# Patient Record
Sex: MALE | Race: WHITE
[De-identification: names, ages, dates, MRNs, and addresses within clinical notes are randomized per-mention and may not be internally consistent; named-entity substitution may affect disease eponyms.]

---

## 2018-04-23 ENCOUNTER — HOSPITAL ENCOUNTER (INPATIENT)
Dept: HOSPITAL 46 - ED | Age: 58
LOS: 4 days | Discharge: HOME | DRG: 200 | End: 2018-04-27
Attending: SURGERY | Admitting: SURGERY
Payer: COMMERCIAL

## 2018-04-23 VITALS — HEIGHT: 74 IN | BODY MASS INDEX: 21.17 KG/M2 | WEIGHT: 164.99 LBS
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
## 2018-04-23 DIAGNOSIS — Z79.84: ICD-10-CM

## 2018-04-23 DIAGNOSIS — E11.9: ICD-10-CM

## 2018-04-23 DIAGNOSIS — S27.0XXA: Primary | ICD-10-CM
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
## 2018-04-23 DIAGNOSIS — S22.42XA: ICD-10-CM
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
## 2018-04-23 PROCEDURE — 0W9B30Z DRAINAGE OF LEFT PLEURAL CAVITY WITH DRAINAGE DEVICE, PERCUTANEOUS APPROACH: ICD-10-PCS | Performed by: INTERNAL MEDICINE

## 2018-04-23 NOTE — NUR
NEW ED ADMIT. CHEST TUBE LEFT SIDE. RIB FX. 1L 02 VIA NC IN PLACE. SATURATING
100%. CHEST XRAY IN MORNING TO FOLLOW. BLOOD SUGAR 240 AT 1600. TELE 8 IN
PLACE. LR AT TKO. GLUCOPHAGE STARTED. DIABETIC. HASNT BEEN TAKING MEDS IN
YEARS. CONTINUOUS PULSE OX. MORPHIN 3MG X1.

## 2018-04-23 NOTE — NUR
pt arrived to med surg room 123 at 1130 with Veronique GRIFFIN RN and Thai DOWNS (nurse
supervisor). patient needed to urinate. standing next to bed attempting to
void into urinal now. seems unable to void. chest tube drainage reading 0ml.
some red blood in tubing from chest tube.

## 2018-04-23 NOTE — NUR
IN ROOM FOR REPORT, PT IS AWAKE IN BED WITH VISITOR IN THE ROOM. HE DENIES
NEEDS AT THIS TIME. CALL LIGHT IS WITHIN REACH.

## 2018-04-23 NOTE — NUR
IN ROOM TO ASSESS PT AND GIVE EVENING MEDICATIONS. PT REPORTS PAIN WITH
COUGHING AND MOVEMENT OF LEFT ARM. PERCOCET GIVEN. PT DENIES FURTHER NEEDS AT
THIS TIME.

## 2018-04-23 NOTE — NUR
IN TO TAKE PT VS. PT RESTING COMFORTABLY WITH HIS EYES CLOSED ON BED. CALL
LIGHT WITHIN REACH, NO REQUESTS AT THIS TIME.
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
## 2018-04-24 NOTE — NUR
PT SITTING IN CHAIR, MUCH MORE ALERT AND ORIENTED TODAY. PT STATED HE FEELS
MUCH BETTER. PT STATED THAT HE HOPES THE THORAX TUBE IS REMOVED TOMORROW. PT
DID SEEM TO ENJOY MY COMPANY. EXTENDED A BLESSING, WILL FOLLOW AS NEEDED

## 2018-04-24 NOTE — NUR
BEDSIDE REPORT RECEIVED FROM SUMAYA DOWNS. PATIENT SITTING UP IN BED. 7ML OF
DRAINAGE OVERNIGHT FROM CHEST TUBE. TOTAL OF 22ML OUT SO FAR.

## 2018-04-24 NOTE — NUR
SBA. SALINE LOCKED. TELE DCd. CHEST TUBE DRAINING SANGUINOUS FLUID. ACCUCHECKS
ACHS. SLIDING SCALE INSULIN. UP IN RECLINER MOST OF DAY. NO PAIN MEDS GIVEN.
PAIN 3/10 TODAY. MILAD TO REMOVE CHEST TUBE TOMORROW LIKELY.

## 2018-04-24 NOTE — NUR
IN ROOM TO ASSESS PT, CHEST TUBE IS STILL DRAINING SMALL AMOUNTS OF
SEROSANGUINOUS FLUID. PT CONTINUES TO HAVE DISCOMFORT AND PERCOCET WAS GIVEN.
DISCUSSED TAKING A STOOL SOFTENER WITH PT BUT HE DOES NOT WANT TO TAKE ONE
TONIGHT, HE WOULD RATHER START IN THE MORNING. PT EXPRESSED FRUSTRATION WITH
HAVING THE NEED TO GO EARLIER IN THE DAY BUT STAFF TOOK TOO LONG TO GET TO HIS
ROOM AND HE NO LONGER FELT THE NEED TO HAVE A BM. APPOLOGIZED TO THE PT AND
ASSURED HIM IF HE FELT THE NEED TO GO TONIGHT WE WOULD GET THERE ASAP. PT HAS
FRESH WATER AT BEDSIDE AND DENIES FURTHER NEEDS. CALL LIGHT IS WITHIN REACH.

## 2018-04-24 NOTE — NUR
PT SITTING UP IN RECLINER. 9ML DRAINED INTO CHEST TUBE CONTAINER SO FAR THIS
SHIFT. 3/10 PAIN IN LEFT RIBS. SMALL AMOUNT OF SEROSANQUINOUS DRAINAGE NOTED
IN TUBING FROM CHEST TUBE. STITCHES INTACT. DRESSING INTACT. 1L 02 VIA NC IN
PLACE.

## 2018-04-24 NOTE — NUR
IN ROOM FOR REPORT, PT IS AWAKE SITTING IN CHAIR. HE DENIES NEEDS AT THIS
TIME. CALL LIGHT IS WITHIN REACH.

## 2018-04-24 NOTE — NUR
PATIENT SITTING UP IN BED WATCHING TV. FRESH ICE WATER. CALL LIGHT WITHIN
REACH. NO OTHER NEEDS AT THIS TIME.

## 2018-04-24 NOTE — NUR
PATIENT SITTING UP IN CHAIR. PATIENT HAS VISITORS IN ROOM. CALL LIGHT WITHIN
REACH. NO OTHER NEEDS AT THIS TIME. CNA STATES SHE GOT PATIENT COFFEE.

## 2018-04-24 NOTE — NUR
TELEMETRY D/Cd PER MD. D/Cd IVF PER MD. TRANSFERRED PATIENT SBA TO RECLINER.
TOLERATED WELL. CONTINUOUS PULSE OX STILL IN PLACE. 1L 02 VIA NC IN PLACE AS
WELL. LIKELY REMOVE CHEST TUBE TOMORROW BY DR STINSON WHO WILL BE TAKING OVER
THIS AFTERNOON.

## 2018-04-25 NOTE — NUR
PT SBA TO BED FROM CHAIR. MEDICATED WITH 1 TAB PERCOCET FOR C/O PAIN AT CHEST
TUBE INSERTION SITE. CALL LIGHT WITHIN REACH. SCD'S ON. SATTING 96% ON 1LNC.

## 2018-04-25 NOTE — NUR
Medications reconciled. Patient takes no medications. He should by taking
metformin but cannot afford it and does not have a PCP. He was given RX value
list from [facility] and [facility] (~$10 for 90 day supply). Would like to see if
hospitalist could write RX for 90 day supply of metformin, should he be
discharged on this medication

## 2018-04-25 NOTE — NUR
PT SITTING UP IN BED AWAKE, ALERT AND ORIENTED. PT DENIES PAIN AT REST, PAIN
AT CHEST TUBE INSERTION SITE WITH DEEP BREATHS AND COUGHING. PT HAVING
MODERATE AMOUNT OF CLEAR/WHITE SPUTUM. BOTH IV SITES FLUSHING WELL, DRESSINGS
CDI. CHEST TUBE SITE WNL, DRESSING INTACT, SMALL AMOUNT OF OLD DRIED BLOOD
NOTED, SUTUTES VISIBLE, CHEST TUBE FUNCTIONING WELL, SCANT AMOUNT OF
SEROSANGINOUS DRAINAGE NOTED. CMS INTACT. RESP UNLABORED, RR 18, SATTING 99%
ON 1L NC. NO CREPITUS NOTED. ATES 100% OF BREAKFAST, LUIS E WELL. CALL LIGHT
WITHIN REACH. SCD'S ON.

## 2018-04-25 NOTE — NUR
THIS CNA AND STUDENT NURSE IN ROOM. PATIENT STATES PAIN LEVEL IS A 1 OUT OF
10, BUT WHEN HE COUGHS IT HURTS MORE. FRESH ICE WATER. WASHCLOTH FOR FACE.
CALL LIGHT WITHIN REACH. NO OTHER NEEDS AT THIS TIME.

## 2018-04-25 NOTE — NUR
IN ROOM TO ASSESS PT, CHEST TUBE IS STILL DRAINING VERY LITTLE. PT HAS NO
COMPLAINTS OF PAIN UNLESS MOVING. HE IS ON 1 LNC AND AT 99% O2 SAT, SCD'S ARE
IN PLACE. AND CALL LIGHT IS WITHIN REACH.

## 2018-04-25 NOTE — NUR
PT SITTING UP IN BED WORKING ON I.S. SATTING 96% ON 1LNC. NO NEW DRAINAGE FROM
CHEST TUBE. DENIES NEEDS OR CONCERNS AT THIS TIME. CALL LIGHT WITHIN REACH.

## 2018-04-25 NOTE — NUR
PT IS AWAKE IN BED. HE STATES HIS PAIN IS 3/10 BUT WOULD LIKE TO HOLD OFF ON
TAKING PERCOCET. FRESH WATER IS AT BEDSIDE AND PT DENIES FURTHER NEEDS.

## 2018-04-25 NOTE — NUR
PT IS A SBA AND HAS URINAL AT THE BEDSIDE. HIS IVS ARE SL AND FLUSH WELL. HE
IS ON 1 LNC AND O2 SAT IS IN THE UPPER 90'S. STOOL SOFTENER WAS ADDED TO EMAR
TO START THIS AM AS PT DID NOT WANT TO START AT NIGHT. HE IS TOLERATING AN ADA
DIET, BS  AT BEDTIME. A REPEAT C-XRAY IS SCHEDULED FOR 0800 THIS
MORNING. CHEST TUBE DRAINED VERY LITTLE THROUGH THE NIGHT.

## 2018-04-25 NOTE — NUR
THIS CNA AND RN NAT TRANSFERRED PATIENT FROM BED TO THE CHAIR. PATIENT SITTING
UP IN THE CHAIR EATING LUNCH. CALL LIGHT WITHIN REACH. NO OTHER NEEDS AT THIS
TIME.

## 2018-04-25 NOTE — NUR
PATIENT SITTING UP IN BED WATCHING TV AND EATING BREAKFAST. CALL LIGHT WITHIN
REACH. NO OTHER NEEDS AT THIS TIME.

## 2018-04-25 NOTE — NUR
STUDENT NURSE IN ROOM. STUDENT NURSE STATES THAT PATIENTS PAIN LEVEL GOES UP
TO AN 8 OUT OF 10 WHEN COUGHING.

## 2018-04-25 NOTE — NUR
PT RESTING IN BED ALERT AND ORIENTED. HE REPORTS HE HAS NO PAIN AT THIS TIME.
CHEST TUBE DRESSING INTACT. CHEST TUBE DRAINAGE DEVICE ASSESSMENT WNL, SUCTION
TURNED OFF PER MD ORDER. LUNGS SOUNDS CLEAR. NO CONCERNS AT THIS TIME

## 2018-04-26 NOTE — NUR
HANDOFF REPORT RECEIVED FROM NIGHT SHIFT RN. PT ON BEDSIDE COMMODE. PT DENIES
OTHER NEEDS AT THIS TIME.

## 2018-04-26 NOTE — NUR
PT ASSISTED TO WALK IN VIDALES. DENIES SOB WITH ACTIVITY. COMPLAINT OF PAIN AT
CHEST TUBE SITE, RATING PAIN 7/10, GIVEN 1 TAB PERCOCET. PT SITTING IN CHAIR.
CHEST TUBE WITH 5 ML SEROSANGUINOUS DRAINAGE. PT DENIES OTHER NEEDS AT THIS
TIME.

## 2018-04-26 NOTE — NUR
ASSESSMENT COMPLETED. PT A/O IN BED. CHEST TUBE DRESSING INTACT. NO LEAKS.
CHEST TUBE DRAINAGE SYSTEM WITH GRAVITY. PT REPORTED PAIN OF 6/10 IN LEFT SIDE
WHEN BREATHING AND MOVING. SATURATIONS ARE 99 ON 1L NC. HEART RATE WNL. NO
ABNORMAL SOUNDS HEARD, ALTHOUGH HEART SOUNDS DISTANT. PT LUNGS ARE DIM IN
UPPER AND LOWER LUNG ANDREWS. PT DOES NOT C/O SOB BUT, IS BREATHING SHALLOW D/T
PAIN. RADIAL AND DORSAL PULSES +2.  CAP REFILL <3 SEC ALL FOUR EXTREMITIES.
BLOOD SUGAR . 2 UNITS INSULIN GIVEN. PT ON CONT PULSE OX AT THIS TIME.
CALL LIGHT WITHIN REACH.

## 2018-04-26 NOTE — NUR
PT RESTING IN BED. PT ON 1L NC, LUNG SOUNDS CLEAR, O2 SATS 100%. PT WITH CHEST
TUBE TO LEFT SIDE, WIHTOUT CREPITUS, TIDALING PRESENT, WIHTOUT SIGNS OF LEAK,
TO WATER SEAL. PT DENIES NAUSEA, TOLERATING ADA DIET, BLOOD GLUCOSE 209, GIVEN
2 UNITS OF SS NOVOLOG. PT WITH BM TODAY, BOWEL TONES ACTIVE. PT ASSISTED TO
CHAIR FOR BREAKFATS. WITHOUT EDEMA, PULSES PALPABLE. PT RATIGN PAIN 4/10,
STATES TOLERABLE, DENIES NEED FOR PAIN MEDICATION. PT DENIES OTHER NEEDS AT
THIS TIME.

## 2018-04-26 NOTE — NUR
PT RESTING QUIELTY IN BED EYES CLOSED RR EVEN AT 20 BPM NO DISTRES SNOTED
OXYGEN SATURATION 97% ON 1L N.C. PT APPEARS TO BE SLEEPING AT THIS TIME

## 2018-04-26 NOTE — NUR
Charge Nurse Rounding Note:. Pt awake, watching tv, no c/o pain. L chest tube
intact, to gravity, scant serous sanguineous drainage present. No c/o sob , no
requests,

## 2018-04-26 NOTE — NUR
PT SITTING IN CHAIR. PT DENIES PAIN. PT ON 1L NC, O2 SATS 98%, DENIES SOB. PT
WITH CHEST TUBE, LUNG SOUNDS CLEAR, WITHOUT CREPITUS, INSERTION SITE WITHOUT
REDNESS, WITHOUT LEAK. PT WITHOUT EDEMA, CMS INTACT. PT BOWEL TONES ACTIVE,
DENIES NAUSEA. DISCUSSED PLAN TO MOVE PT TO NEW ROOM. PT DENIES OTHER NEEDS AT
THIS TIME.

## 2018-04-26 NOTE — NUR
PT ASSITED INTO BATHROOM TO WASH UP AT SINK. PROVIDED WITH ELECTRIC RAZOR TO
SHAVE. REMAINED IN ROOM WHILE PT BATHING.

## 2018-04-26 NOTE — NUR
PT WALKED IN VIDALES X2, TOLERASTING WELL. CHEST TUBE REMAINED TO WATER SEAL,
VAPRO LOCKED THIS AFTERNOON, CHANGED TO NEW COLLECTION DEVICE, NOW WORKING
WELL. PT ON 1L NC, LUNG SOUNDS CLEAR, CONTINUOUS PULSE OX. PT TOLERATING ADA
DIET, BLOOD GLUCOE AT DINER 263, GIVEN 3 UNITS SS INSULIN. PT WITH BM TODAY,
BOWEL TONES ACTIVE. SALINE LOCKED. VOIDING QS.

## 2018-04-26 NOTE — NUR
RECIEVED BEDSIDE REPORT FROM DAY SHIFT RN. PT A/O IN BED. SITTING UP. IV IN
RIGHT AC IS SL. WATER SEAL CHEST TUBE IN PLACE. PT IS ON CONT PULSE OX. HR 89,
SATURATION 98% ON 1L O2. PT REPORTS NO NEEDS AT THIS TIME. CALL LIGHT WITHIN
REACH.

## 2018-04-26 NOTE — NUR
PT WALKED IN VIDALES WITH SBA. PT MOVED TO ROOM 115, BELONGINGS WITH PT. PT NOW
RESTING IN BED. PT DENIES OTHER NEEDS AT THIS TIME.

## 2018-04-26 NOTE — NUR
PT SITTING IN CHAIR, LOOKING AT PHONE. HE IS ALERT AND ORIENTED. PT LOOKING AT
HIS PHONE, MENTIONED THAT HE WAS HOPING TO HAVE TUBE OUT TODAY, AND SEEMS
SOMEWHAT DISCOURAGED THAT IT WILL BE IN LONGER. "BUT IF IT NEEDS TO IT NEEDS
TO", IS PT'S ATTITUDE REGARDING THE DRAIN TUBE REMAINING. EXTENDED A BLESSING,
WILL FOLLOW AS NEEDED

## 2018-04-26 NOTE — NUR
PT HAS SLEPT WELL OVER SHIFT. HE HAS REPORTED NO PAIN OR NAUSEA. STANDBY
ASSIST TO BATHROOM, CHEST TUBE OFF SUCTION PER MD ORDER. LUNG SOUNDS CLEAR.
DRESSING INTACT. PT 97% ON 1L OXYGEN. GOOD APPETITE. VOIDING QUANTITY
SUFFICIENT. COOPERATIV WITH CARE PLAN.

## 2018-04-26 NOTE — NUR
PT RESTING IN BED. PT COMPLAINT OF PAIN TO CHEST TUBE INSERTION SITE, DENIES
SOB, 02 SATS 100% ON 1L NC. CHEST TUBE WATER SEAL WITHOUT TIDALING RAISED TO
MAX LEVEL. PT WITOUT CREPITUS. CHARGE RN ASKED TO EVALUATE TUBE. COLLECTION
DEVICE CHANGED TO NEW SYSTEM, TIDALING PRESENT, WITHOUT SIGNS OF LEAK. PT
BLOOD GLUCOSE 263, 3 UNITS SS NOVOLOG GIVEN. PT SITTING IN BED EATING DINNER.
PT DENIES OTHER NEEDS AT THIS TIME. PLAN TO BATH AT SINK AFTER DINNER.

## 2018-04-27 NOTE — NUR
PT APPEARS TO BE SLEEPING. RESPIRATIONS EQUAL AND UNLABORED. CHEST TUBE
INTACT. HEART RATE AND SATURATION WNL. CALL LIGHT WTIHIN REACH.

## 2018-04-27 NOTE — NUR
PT RESTING IN CHAIR. PT DENIES PAIN. PT ON ROOM AIR, LUNG SOUNDS CLEAR, DENIES
SOB. CHEST TUBE REMOVED BY DR. STINSON. PT INDEPENDENT IN ROOM. CHEST XRAY
COMPLETED. NO ACUTE CHANGES. PT DENIES OTHER NEEDS AT THIS TIME.

## 2018-04-27 NOTE — NUR
PT SLEPT THROUGHOUT THE NIGHT. X-RAY THIS AM. PT IS ON CONT PULSE OX.
SATURATIONS AND HR WNL THIS SHIFT. 2L NC IN PLACE. FIELD START IN RAC.
PERCOCET FOR PAIN.
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
## 2018-04-27 NOTE — NUR
DR. STINSON CALLED AND GAVE VERBAL ORDER TO DISCHARGE PT. PT NOTIFIED. PT
CALLING TO ARRANGE RIDE. PT BLOOD GLUCOSE 235, 3 UNITS SS INSULIN GIVEN. PT
DENIES OTHER NEEDS AT THIS TIME.

## 2018-04-27 NOTE — NUR
VITALS AND I&OS DONE AND CHARTED. FRESH ICE WATER GIVEN. GARBAGES EMPTIED.
ROOM AND BATHROOM CLEANED UP. BEDSIDE TABLE AND CALL LIGHT WITHIN REACH. PT
NEEDS NOTHING ELSE AT THIS TIME.

## 2018-04-27 NOTE — NUR
PT A/O IN BED. REQUESTED COFFEE. PT REPORTS NO PAIN AT THIS TIME. RESPIRATIONS
ARE EQUAL AND NONLABORED. HR AND O2 SATURATIONS ARE WNL. CHEST TUBE WATER
SEALED WITH NO LEAKS. DRESSING IS C/D/I. CONT PULSE OX IN PLACE. ASSESSMENT
COMPLETED. NO CHANGES FROM BEGINING OF SHIFT. CALL LIGHT WITHIN REACH.

## 2018-04-27 NOTE — NUR
PT APPEARS TO BE SLEEPING. DATURATION AT 96% HEART RATE AT 69. RESPIRATIONS
ARE EQUAL AND NONLABORED. CHECT TUBE DRAIN SEALED WITH NO LEAKS. CALL LIGHT
WITHIN REACH.

## 2018-04-27 NOTE — NUR
PT RESTING IN BED EATING BREAKFAST. PT BLOOD GLUOCE 163, GIVEN 1 UNIT SS
INSULIN. PT DENIES PAIN. PT ON 1L NC, O2 SATS 98%, OCCASIONAL COUGH, LUNG
SOUNDS CLEAR. CHEST TUBE TO LEFT SIDE, TIDALING PRESENT, WITHOUT AIR LEAK,
WITHOUT CREPITUS. PT BOWEL TONES ACTIVE, DENIES NAUSEA, TOLERATING ADA DIET.
PT WIHTOUT EDEMA, CMS INTACT. SALINE LOCKED, PATENT. PT DENIES OTHER NEEDS AT
THIS TIME.

## 2018-04-27 NOTE — NUR
BEDSIDE HANDOFF REPORT RECEIVED FROM NIGHT SHIFT RN. PT RESTING IN BED. PT ON
1L NC, O2 SATS 98%. CHEST TUBE TO LEFT CHEST, TIDALING PRESENT WITHOUT LEAK.
PT DENIES OTHER NEEDS AT THIS TIME.